# Patient Record
(demographics unavailable — no encounter records)

---

## 2024-11-08 NOTE — ASSESSMENT
[FreeTextEntry1] : Recovered well after laparoscopic appendectomy. May resume regular activity in 2 weeks without restriction. Await pathology. Return to office as clinically indicated.

## 2024-11-08 NOTE — HISTORY OF PRESENT ILLNESS
[de-identified] : Mr. Tejada is a 20 y/o male who presented to [facility] 10/27/2024 with acute appendicitis. He is s/p laparoscopic appendectomy 10/27/2024. He has recovered well. Pathology pending at this time.

## 2025-06-16 NOTE — PHYSICAL EXAM
Health Maintenance       Diabetes Eye Exam (Yearly)  Overdue since 8/23/2022    Diabetes Foot Exam (Yearly)  Overdue since 7/11/2024    COVID-19 Vaccine (7 - 2024-25 season)  Overdue since 9/1/2024    Diabetes A1C (Every 6 Months)  Due soon on 7/6/2025           Following review of the above:  Patient is not proceeding with: COVID-19    Note: Refer to final orders and clinician documentation.      Advance care planning documents on file - no    Review Flowsheet  More data exists         6/16/2025   PHQ 2/9 Score   Adult PHQ 2 Score 0   Adult PHQ 2 Interpretation No further screening needed   Little interest or pleasure in activity? Not at all   Feeling down, depressed or hopeless? Not at all        [FreeTextEntry1] : Abdomen: soft, nontender/nondistended.  Incisions healed well.